# Patient Record
Sex: MALE | Race: WHITE | NOT HISPANIC OR LATINO | Employment: UNEMPLOYED | URBAN - METROPOLITAN AREA
[De-identification: names, ages, dates, MRNs, and addresses within clinical notes are randomized per-mention and may not be internally consistent; named-entity substitution may affect disease eponyms.]

---

## 2024-01-21 ENCOUNTER — APPOINTMENT (OUTPATIENT)
Dept: RADIOLOGY | Facility: HOSPITAL | Age: 64
End: 2024-01-21
Payer: MEDICARE

## 2024-01-21 ENCOUNTER — APPOINTMENT (OUTPATIENT)
Dept: CARDIOLOGY | Facility: HOSPITAL | Age: 64
End: 2024-01-21
Payer: MEDICARE

## 2024-01-21 ENCOUNTER — HOSPITAL ENCOUNTER (EMERGENCY)
Facility: HOSPITAL | Age: 64
Discharge: NURSING, REHAB OR LONG-TERM CARE | End: 2024-01-22
Attending: STUDENT IN AN ORGANIZED HEALTH CARE EDUCATION/TRAINING PROGRAM
Payer: MEDICARE
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
## 2024-01-21 LAB
ALBUMIN SERPL BCP-MCNC: 3.6 G/DL (ref 3.4–5)
ALP SERPL-CCNC: 75 U/L (ref 33–136)
ALT SERPL W P-5'-P-CCNC: 12 U/L (ref 10–52)
ANION GAP SERPL CALC-SCNC: 8 MMOL/L (ref 10–20)
APPEARANCE UR: CLEAR
AST SERPL W P-5'-P-CCNC: 13 U/L (ref 9–39)
BILIRUB SERPL-MCNC: 0.7 MG/DL (ref 0–1.2)
BILIRUB UR STRIP.AUTO-MCNC: NEGATIVE MG/DL
BUN SERPL-MCNC: 14 MG/DL (ref 6–23)
CALCIUM SERPL-MCNC: 8 MG/DL (ref 8.6–10.3)
CARDIAC TROPONIN I PNL SERPL HS: 4 NG/L (ref 0–20)
CHLORIDE SERPL-SCNC: 104 MMOL/L (ref 98–107)
CO2 SERPL-SCNC: 30 MMOL/L (ref 21–32)
COLOR UR: YELLOW
CREAT SERPL-MCNC: 0.78 MG/DL (ref 0.5–1.3)
EGFRCR SERPLBLD CKD-EPI 2021: >90 ML/MIN/1.73M*2
ERYTHROCYTE [DISTWIDTH] IN BLOOD BY AUTOMATED COUNT: 13.9 % (ref 11.5–14.5)
FLUAV RNA RESP QL NAA+PROBE: NOT DETECTED
FLUBV RNA RESP QL NAA+PROBE: NOT DETECTED
GLUCOSE SERPL-MCNC: 147 MG/DL (ref 74–99)
GLUCOSE UR STRIP.AUTO-MCNC: ABNORMAL MG/DL
HCT VFR BLD AUTO: 36.5 % (ref 41–52)
HGB BLD-MCNC: 11.7 G/DL (ref 13.5–17.5)
KETONES UR STRIP.AUTO-MCNC: NEGATIVE MG/DL
LEUKOCYTE ESTERASE UR QL STRIP.AUTO: NEGATIVE
MCH RBC QN AUTO: 28.8 PG (ref 26–34)
MCHC RBC AUTO-ENTMCNC: 32.1 G/DL (ref 32–36)
MCV RBC AUTO: 90 FL (ref 80–100)
NITRITE UR QL STRIP.AUTO: NEGATIVE
NRBC BLD-RTO: 0 /100 WBCS (ref 0–0)
PH UR STRIP.AUTO: 5 [PH]
PLATELET # BLD AUTO: 303 X10*3/UL (ref 150–450)
POTASSIUM SERPL-SCNC: 3.8 MMOL/L (ref 3.5–5.3)
PROT SERPL-MCNC: 6.3 G/DL (ref 6.4–8.2)
PROT UR STRIP.AUTO-MCNC: ABNORMAL MG/DL
RBC # BLD AUTO: 4.06 X10*6/UL (ref 4.5–5.9)
RBC # UR STRIP.AUTO: NEGATIVE /UL
RBC #/AREA URNS AUTO: NORMAL /HPF
SARS-COV-2 RNA RESP QL NAA+PROBE: NOT DETECTED
SODIUM SERPL-SCNC: 138 MMOL/L (ref 136–145)
SP GR UR STRIP.AUTO: 1.02
UROBILINOGEN UR STRIP.AUTO-MCNC: 2 MG/DL
WBC # BLD AUTO: 9 X10*3/UL (ref 4.4–11.3)
WBC #/AREA URNS AUTO: NORMAL /HPF

## 2024-01-21 PROCEDURE — 85027 COMPLETE CBC AUTOMATED: CPT

## 2024-01-21 PROCEDURE — 99285 EMERGENCY DEPT VISIT HI MDM: CPT | Performed by: STUDENT IN AN ORGANIZED HEALTH CARE EDUCATION/TRAINING PROGRAM

## 2024-01-21 PROCEDURE — 70450 CT HEAD/BRAIN W/O DYE: CPT | Performed by: STUDENT IN AN ORGANIZED HEALTH CARE EDUCATION/TRAINING PROGRAM

## 2024-01-21 PROCEDURE — 36415 COLL VENOUS BLD VENIPUNCTURE: CPT

## 2024-01-21 PROCEDURE — 87636 SARSCOV2 & INF A&B AMP PRB: CPT | Performed by: STUDENT IN AN ORGANIZED HEALTH CARE EDUCATION/TRAINING PROGRAM

## 2024-01-21 PROCEDURE — 72125 CT NECK SPINE W/O DYE: CPT

## 2024-01-21 PROCEDURE — 71045 X-RAY EXAM CHEST 1 VIEW: CPT | Mod: FOREIGN READ | Performed by: RADIOLOGY

## 2024-01-21 PROCEDURE — 84484 ASSAY OF TROPONIN QUANT: CPT | Performed by: STUDENT IN AN ORGANIZED HEALTH CARE EDUCATION/TRAINING PROGRAM

## 2024-01-21 PROCEDURE — 70450 CT HEAD/BRAIN W/O DYE: CPT

## 2024-01-21 PROCEDURE — 71045 X-RAY EXAM CHEST 1 VIEW: CPT

## 2024-01-21 PROCEDURE — 72125 CT NECK SPINE W/O DYE: CPT | Performed by: STUDENT IN AN ORGANIZED HEALTH CARE EDUCATION/TRAINING PROGRAM

## 2024-01-21 PROCEDURE — 80053 COMPREHEN METABOLIC PANEL: CPT

## 2024-01-21 PROCEDURE — 93005 ELECTROCARDIOGRAM TRACING: CPT

## 2024-01-21 PROCEDURE — 81001 URINALYSIS AUTO W/SCOPE: CPT | Performed by: STUDENT IN AN ORGANIZED HEALTH CARE EDUCATION/TRAINING PROGRAM

## 2024-01-21 ASSESSMENT — PAIN SCALES - GENERAL: PAINLEVEL_OUTOF10: 7

## 2024-01-21 ASSESSMENT — COLUMBIA-SUICIDE SEVERITY RATING SCALE - C-SSRS
1. IN THE PAST MONTH, HAVE YOU WISHED YOU WERE DEAD OR WISHED YOU COULD GO TO SLEEP AND NOT WAKE UP?: NO
2. HAVE YOU ACTUALLY HAD ANY THOUGHTS OF KILLING YOURSELF?: NO
6. HAVE YOU EVER DONE ANYTHING, STARTED TO DO ANYTHING, OR PREPARED TO DO ANYTHING TO END YOUR LIFE?: NO

## 2024-01-21 ASSESSMENT — LIFESTYLE VARIABLES
EVER FELT BAD OR GUILTY ABOUT YOUR DRINKING: NO
HAVE YOU EVER FELT YOU SHOULD CUT DOWN ON YOUR DRINKING: NO
REASON UNABLE TO ASSESS: NO
HAVE PEOPLE ANNOYED YOU BY CRITICIZING YOUR DRINKING: NO
EVER HAD A DRINK FIRST THING IN THE MORNING TO STEADY YOUR NERVES TO GET RID OF A HANGOVER: NO

## 2024-01-21 ASSESSMENT — PAIN - FUNCTIONAL ASSESSMENT: PAIN_FUNCTIONAL_ASSESSMENT: 0-10
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
## 2024-01-22 VITALS
DIASTOLIC BLOOD PRESSURE: 66 MMHG | TEMPERATURE: 97.9 F | SYSTOLIC BLOOD PRESSURE: 134 MMHG | OXYGEN SATURATION: 98 % | WEIGHT: 250 LBS | HEART RATE: 78 BPM | BODY MASS INDEX: 35.79 KG/M2 | RESPIRATION RATE: 16 BRPM | HEIGHT: 70 IN

## 2024-01-22 LAB — HOLD SPECIMEN: NORMAL

## 2024-01-22 PROCEDURE — 97161 PT EVAL LOW COMPLEX 20 MIN: CPT | Mod: GP | Performed by: PHYSICAL THERAPIST

## 2024-01-22 PROCEDURE — 97165 OT EVAL LOW COMPLEX 30 MIN: CPT | Mod: GO | Performed by: OCCUPATIONAL THERAPIST

## 2024-01-22 ASSESSMENT — COGNITIVE AND FUNCTIONAL STATUS - GENERAL
DAILY ACTIVITIY SCORE: 18
WALKING IN HOSPITAL ROOM: A LOT
TOILETING: A LITTLE
HELP NEEDED FOR BATHING: A LOT
DRESSING REGULAR UPPER BODY CLOTHING: A LITTLE
MOVING TO AND FROM BED TO CHAIR: A LITTLE
DRESSING REGULAR LOWER BODY CLOTHING: A LOT
MOBILITY SCORE: 16
MOVING FROM LYING ON BACK TO SITTING ON SIDE OF FLAT BED WITH BEDRAILS: A LITTLE
STANDING UP FROM CHAIR USING ARMS: A LITTLE
TURNING FROM BACK TO SIDE WHILE IN FLAT BAD: A LITTLE
CLIMB 3 TO 5 STEPS WITH RAILING: A LOT

## 2024-01-22 ASSESSMENT — PAIN - FUNCTIONAL ASSESSMENT
PAIN_FUNCTIONAL_ASSESSMENT: 0-10
PAIN_FUNCTIONAL_ASSESSMENT: 0-10

## 2024-01-22 ASSESSMENT — PAIN SCALES - GENERAL
PAINLEVEL_OUTOF10: 0 - NO PAIN
PAINLEVEL_OUTOF10: 0 - NO PAIN
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
## 2024-01-22 NOTE — PROGRESS NOTES
Call from ED to assist with placement.  Pt was recently at [facility].  He was dc'd home, and has had falls since then.  PT/OT has been ordered, awaiting their evals.    11:00am:  PT/OT is recommending mod intensity.  Met with pt, who is requesting to go back to [facility].  He has already spoken with them.  Referral sent to [facility].    12:26pm:  [facility] is able to accept.  Request for precert to be started by [facility] Precert Team.    12:58pm: Pt has precert.  [place] sent to [facility].  Request to [facility] for passr.  Request to ED to arrange transportation.    3:22pm:  Transportation arranged for 5pm.  Facility updated.  ED to update bedside RN and pt.    MATTHEW Sanchez

## 2024-01-22 NOTE — PROGRESS NOTES
Physical Therapy    Physical Therapy Evaluation    Patient Name: Tyron Stover  MRN: [id number]  Today's Date: 1/22/2024   Time Calculation  Start Time: 0935  Stop Time: 0945  Time Calculation (min): 10 min    Assessment/Plan   PT Assessment  PT Assessment Results: Decreased endurance, Impaired balance, Decreased mobility, Decreased strength  Rehab Prognosis: Good  Evaluation/Treatment Tolerance: Patient tolerated treatment well  Medical Staff Made Aware: Yes  End of Session Communication: Bedside nurse  Assessment Comment: Pt is a 64 y.o. male admitted for No admission diagnoses are documented for this encounter. on 1/21/2024. Pt below functional level and will benefit from skilled therapy during stay to improve overall functional mobility, strength, ROM, endurance and safety awareness. Upon discharge pt will benefit from moderate intensity therapy for continued improvement in functional mobility. Therapy will continue to follow and reassess each session.      End of Session Patient Position:  ([place]  IP OR SWING BED PT PLAN  Inpatient or Swing Bed: Inpatient  PT Plan  Treatment/Interventions: Transfer training, Gait training, Stair training, Balance training, Strengthening, Therapeutic exercise, Therapeutic activity  PT Plan: Skilled PT  PT Eval Only Reason: At baseline function  PT Frequency: 3 times per week  PT Discharge Recommendations: Moderate intensity level of continued care  Equipment Recommended upon Discharge: Wheeled walker  PT Recommended Transfer Status: Assist x1  PT - OK to Discharge: Yes To next level of care when cleared by medical team.      Subjective       General Visit Information:  General  Reason for Referral: impaired mobility, frequent falls  Referred By: ED referral  Past Medical History Relevant to Rehab: PMH of gastric bypass,  COPD, DM II, , Hyperlipidaemia, GERD, DM, peptic ulcer, hemorrhage, duodenal ulcer, HTN, joint pain, CVA  Co-Treatment: OT  Prior to Session Communication:  Bedside nurse  Patient Position Received:  (Sitting EOB on ED donald)  Preferred Learning Style: kinesthetic, verbal  General Comment: Pt agreeable to therapy    Home Living:  Home Living  Type of Home: House  Lives With: Alone (Is staying at an air BnB that his friend owns)  Home Adaptive Equipment:  (rollator walker)  Home Layout: One level  Home Access: Stairs to enter with rails  Entrance Stairs-Rails: Right  Entrance Stairs-Number of Steps: 2    Prior Level of Function:  Prior Function Per Pt/Caregiver Report  Level of [place]: Independent with ADLs and functional transfers, Needs assistance with homemaking  Receives Help From: Family    Precautions:  Precautions  Medical Precautions: Fall precautions    Vital Signs:     Objective     Pain:  Pain Assessment  Pain Assessment: 0-10  Pain Score: 0 - No pain    Cognition:  Cognition  Overall Cognitive Status: Within Functional Limits    General Assessments:      Activity Tolerance  Endurance: Tolerates less than 10 min exercise, no significant change in vital signs                 Static Sitting Balance  Static Sitting-Comment/Number of Minutes: fair  Dynamic Sitting Balance  Dynamic Sitting-Comments: fair  Static Standing Balance  Static Standing-Comment/Number of Minutes: fair  Dynamic Standing Balance  Dynamic Standing-Comments: fair-    Functional Assessments:        Transfers  Transfer: Yes  Transfer 1  Technique 1: Sit to stand, Stand to sit  Transfer Device 1: Walker  Transfer Level of Assistance 1: Minimum assistance  Ambulation/Gait Training  Ambulation/Gait Training Performed: Yes  Ambulation/Gait Training 1  Surface 1: Level tile  Device 1: Rolling walker  Assistance 1: Minimum assistance  Comments/Distance (ft) 1: 20          Extremity/Trunk Assessments:        RLE   RLE :  (grossly 4-/5)  LLE   LLE :  (grossly4-/5)    Outcome Measures:  [facility] Basic Mobility  Turning from your back to your side while in a flat bed without using bedrails: A  little  Moving from lying on your back to sitting on the side of a flat bed without using bedrails: A little  Moving to and from bed to chair (including a wheelchair): A little  Standing up from a chair using your arms (e.g. wheelchair or bedside chair): A little  To walk in hospital room: A lot  Climbing 3-5 steps with railing: A lot  Basic Mobility - Total Score: 16          Goals:  Encounter Problems       Encounter Problems (Active)       PT Problem       Pt will demonstrate mod I for all transfers with WW  (Progressing)       Start:  01/22/24    Expected End:  02/05/24            Pt will ambulate 150 ft with WW and mod I.  (Progressing)       Start:  01/22/24    Expected End:  02/05/24            Pt will demonstrate good dynamic standing balance while performing a functional task.   (Progressing)       Start:  01/22/24    Expected End:  02/05/24            Pt will demonstrate BLE WFLs.   (Progressing)       Start:  01/22/24    Expected End:  02/05/24                 Education Documentation  Precautions, taught by Holli Orellana PT at 1/22/2024 10:03 AM.  Learner: Patient  Readiness: Acceptance  Method: Explanation  Response: Verbalizes Understanding    Body Mechanics, taught by Holli Orellana PT at 1/22/2024 10:03 AM.  Learner: Patient  Readiness: Acceptance  Method: Explanation  Response: Verbalizes Understanding    Mobility Training, taught by Holli Orellana PT at 1/22/2024 10:03 AM.  Learner: Patient  Readiness: Acceptance  Method: Explanation  Response: Verbalizes Understanding    Education Comments  No comments found.

## 2024-01-31 LAB
ATRIAL RATE: 71 BPM
P AXIS: 73 DEGREES
P OFFSET: 178 MS
P ONSET: 148 MS
PR INTERVAL: 148 MS
Q ONSET: 222 MS
QRS COUNT: 12 BEATS
QRS DURATION: 108 MS
QT INTERVAL: 432 MS
QTC CALCULATION(BAZETT): 469 MS
QTC FREDERICIA: 457 MS
R AXIS: 13 DEGREES
T AXIS: 19 DEGREES
T OFFSET: 438 MS
VENTRICULAR RATE: 71 BPM